# Patient Record
Sex: FEMALE | Race: WHITE | NOT HISPANIC OR LATINO | Employment: UNEMPLOYED | URBAN - NONMETROPOLITAN AREA
[De-identification: names, ages, dates, MRNs, and addresses within clinical notes are randomized per-mention and may not be internally consistent; named-entity substitution may affect disease eponyms.]

---

## 2022-01-25 ENCOUNTER — OFFICE VISIT (OUTPATIENT)
Dept: CARDIOLOGY | Facility: CLINIC | Age: 52
End: 2022-01-25

## 2022-01-25 VITALS
HEART RATE: 90 BPM | OXYGEN SATURATION: 100 % | BODY MASS INDEX: 26.68 KG/M2 | DIASTOLIC BLOOD PRESSURE: 83 MMHG | HEIGHT: 66 IN | WEIGHT: 166 LBS | SYSTOLIC BLOOD PRESSURE: 122 MMHG

## 2022-01-25 DIAGNOSIS — R07.9 CHEST PAIN, UNSPECIFIED TYPE: Primary | ICD-10-CM

## 2022-01-25 DIAGNOSIS — E78.5 DYSLIPIDEMIA: ICD-10-CM

## 2022-01-25 DIAGNOSIS — R00.2 PALPITATIONS: ICD-10-CM

## 2022-01-25 DIAGNOSIS — I83.813 VARICOSE VEINS OF BOTH LOWER EXTREMITIES WITH PAIN: ICD-10-CM

## 2022-01-25 PROCEDURE — 99204 OFFICE O/P NEW MOD 45 MIN: CPT | Performed by: PHYSICIAN ASSISTANT

## 2022-01-25 PROCEDURE — 93000 ELECTROCARDIOGRAM COMPLETE: CPT | Performed by: PHYSICIAN ASSISTANT

## 2022-01-25 RX ORDER — ATORVASTATIN CALCIUM 20 MG/1
20 TABLET, FILM COATED ORAL DAILY
COMMUNITY

## 2022-01-25 RX ORDER — PAROXETINE HYDROCHLORIDE 20 MG/1
20 TABLET, FILM COATED ORAL EVERY MORNING
COMMUNITY

## 2022-01-25 RX ORDER — EMPAGLIFLOZIN 25 MG/1
25 TABLET, FILM COATED ORAL DAILY
COMMUNITY
Start: 2021-12-27

## 2022-01-25 RX ORDER — TIZANIDINE 2 MG/1
2 TABLET ORAL 2 TIMES DAILY
COMMUNITY
Start: 2021-12-27

## 2022-01-25 RX ORDER — ROPINIROLE 0.5 MG/1
TABLET, FILM COATED ORAL
COMMUNITY
Start: 2021-12-10

## 2022-01-25 RX ORDER — DICLOFENAC SODIUM 75 MG/1
75 TABLET, DELAYED RELEASE ORAL 2 TIMES DAILY
COMMUNITY

## 2022-01-25 RX ORDER — HYDROXYZINE HYDROCHLORIDE 25 MG/1
25 TABLET, FILM COATED ORAL 3 TIMES DAILY
COMMUNITY

## 2022-01-25 RX ORDER — GABAPENTIN 300 MG/1
300 CAPSULE ORAL DAILY
COMMUNITY

## 2022-01-25 RX ORDER — NITROGLYCERIN 0.4 MG/1
TABLET SUBLINGUAL
Qty: 25 TABLET | Refills: 11 | Status: SHIPPED | OUTPATIENT
Start: 2022-01-25

## 2022-01-25 NOTE — PATIENT INSTRUCTIONS

## 2022-01-25 NOTE — PROGRESS NOTES
Subjective   Alpa Barr is a 51 y.o. female     Chief Complaint   Patient presents with   • New Patient   • Chest Pain   • Palpitations       HPI    Patient is a 51-year-old female who presents to the office for evaluation.  Patient used to see cardiology in [facility].  She recently has moved to [place]    Patient describes that at random times she will feel a substernal discomfort like a slight ache but then it resolves.  This occurs at random and does not seem to have any exacerbating factor but has been noticeable for quite some time.  She does not describe any level of shortness of breath.  She does not describe PND or orthopnea.    She does describe palpitating at times.  She will feel as if her heart is stopping.  This occurs at random.  She has had some dizziness in the past but does not describe any presyncope or syncope.    Patient also describes discomfort in the lower extremities.  She has significant varicosities of the lower extremities as coaster some pain but otherwise has been doing well      Current Outpatient Medications   Medication Sig Dispense Refill   • atorvastatin (LIPITOR) 20 MG tablet Take 20 mg by mouth Daily.     • diclofenac (VOLTAREN) 75 MG EC tablet Take 75 mg by mouth 2 (Two) Times a Day.     • gabapentin (NEURONTIN) 300 MG capsule Take 300 mg by mouth Daily.     • hydrOXYzine (ATARAX) 25 MG tablet Take 25 mg by mouth 3 (Three) Times a Day.     • Jardiance 25 MG tablet tablet Take 25 mg by mouth Daily.     • PARoxetine (PAXIL) 20 MG tablet Take 20 mg by mouth Every Morning.     • rOPINIRole (REQUIP) 0.5 MG tablet TAKE 1 TABLET BY MOUTH AT BEDTIME FOR RESTLESS LEGS     • tiZANidine (ZANAFLEX) 2 MG tablet 2 mg 2 (Two) Times a Day.     • nitroglycerin (NITROSTAT) 0.4 MG SL tablet 1 under the tongue as needed for angina, may repeat q5mins for up three doses 25 tablet 11     No current facility-administered medications for this visit.       Patient has no known  "allergies.    Past Medical History:   Diagnosis Date   • Anxiety    • Arthritis    • Diabetes mellitus (HCC)    • Hyperlipidemia        Social History     Socioeconomic History   • Marital status: Significant Other   Tobacco Use   • Smoking status: Never Smoker   • Smokeless tobacco: Never Used   Substance and Sexual Activity   • Alcohol use: Never   • Drug use: Never   • Sexual activity: Defer       Family History   Problem Relation Age of Onset   • Heart attack Mother    • Diabetes Mother    • Heart attack Father    • Coronary artery disease Father    • Heart attack Maternal Grandmother        Review of Systems   Constitutional: Negative.  Negative for chills and fever.   HENT: Negative.  Negative for congestion and sinus pressure.    Respiratory: Negative for chest tightness and shortness of breath (at times, thinks related to stress and anxiety).    Cardiovascular: Positive for chest pain and palpitations (flutters at times, flops, makes her cough when it happens). Negative for leg swelling.   Gastrointestinal: Negative.  Negative for abdominal pain, blood in stool, constipation, diarrhea, nausea and vomiting.   Endocrine: Negative.  Negative for cold intolerance and heat intolerance.   Genitourinary: Negative.  Negative for dysuria, frequency, hematuria and urgency.   Musculoskeletal: Positive for back pain (chronic).   Neurological: Positive for dizziness (thinks related to stress, when turning at times). Negative for syncope and light-headedness.   Psychiatric/Behavioral: Positive for sleep disturbance (reports soa at times). The patient is nervous/anxious (reports increased stressed and anxiety recently).        Objective   Vitals:    01/25/22 1103   BP: 122/83   BP Location: Left arm   Patient Position: Sitting   Pulse: 90   SpO2: 100%   Weight: 75.3 kg (166 lb)   Height: 167.6 cm (66\")      /83 (BP Location: Left arm, Patient Position: Sitting)   Pulse 90   Ht 167.6 cm (66\")   Wt 75.3 kg (166 lb)  "  SpO2 100%   BMI 26.79 kg/m²     Lab Results (most recent)     None          Physical Exam  Vitals and nursing note reviewed.   Constitutional:       General: She is not in acute distress.     Appearance: Normal appearance. She is well-developed.   HENT:      Head: Normocephalic and atraumatic.   Eyes:      General: No scleral icterus.        Right eye: No discharge.         Left eye: No discharge.      Conjunctiva/sclera: Conjunctivae normal.   Neck:      Vascular: No carotid bruit.   Cardiovascular:      Rate and Rhythm: Normal rate and regular rhythm.      Heart sounds: Normal heart sounds. No murmur heard.  No friction rub. No gallop.    Pulmonary:      Effort: Pulmonary effort is normal. No respiratory distress.      Breath sounds: Normal breath sounds. No wheezing or rales.   Chest:      Chest wall: No tenderness.   Musculoskeletal:      Right lower leg: No edema.      Left lower leg: No edema.      Comments: Significant varicosities of the lower extremities noted   Skin:     General: Skin is warm and dry.      Coloration: Skin is not pale.      Findings: No erythema or rash.   Neurological:      Mental Status: She is alert and oriented to person, place, and time.      Cranial Nerves: No cranial nerve deficit.   Psychiatric:         Behavior: Behavior normal.         Procedure     ECG 12 Lead    Date/Time: 1/25/2022 11:10 AM  Performed by: Justin Westfall PA  Authorized by: Justin Westfall PA   Comparison: not compared with previous ECG   Previous ECG: no previous ECG available  Comments: EKG demonstrates sinus rhythm at 83 bpm possible septal wall MI age undetermined and no acute ST changes                 Assessment/Plan     Problems Addressed this Visit        Cardiac and Vasculature    Dyslipidemia    Relevant Orders    Adult Transthoracic Echo Complete W/ Cont if Necessary Per Protocol    Stress Test With Myocardial Perfusion One Day    Cardiac Event Monitor    Palpitations    Relevant Orders     Adult Transthoracic Echo Complete W/ Cont if Necessary Per Protocol    Stress Test With Myocardial Perfusion One Day    Cardiac Event Monitor    Chest pain - Primary    Relevant Orders    Adult Transthoracic Echo Complete W/ Cont if Necessary Per Protocol    Stress Test With Myocardial Perfusion One Day    Cardiac Event Monitor      Other Visit Diagnoses     Varicose veins of both lower extremities with pain        Relevant Orders    Ambulatory Referral to Vascular Surgery      Diagnoses       Codes Comments    Chest pain, unspecified type    -  Primary ICD-10-CM: R07.9  ICD-9-CM: 786.50     Palpitations     ICD-10-CM: R00.2  ICD-9-CM: 785.1     Dyslipidemia     ICD-10-CM: E78.5  ICD-9-CM: 272.4     Varicose veins of both lower extremities with pain     ICD-10-CM: I83.813  ICD-9-CM: 454.8             Recommendation  1.  Patient is a 51-year-old male who presents to the office for evaluation that complains of substernal discomfort and complains of palpitations.  She would like to have cardiac work-up performed    2.  Patient will be scheduled for stress testing.  With significant lower extremity discomfort, we will schedule for nuclear testing.    3.  Echo to evaluate LV structure, LV function, assess diastolic performance etc.    4.  I have discussed about her venous issues of the lower extremities and discussed referral to a vascular surgeon that could potentially help.  She does seem interested and we will make referral    5.  Nitroglycerin has been sent in as needed for chest pain.  Any chest pain, not resolved with nitroglycerin as discussed, she is to go to the ER.  We will see her back for follow-up on testing.  Follow-up with primary as scheduled           Patient brought in medicine list to appointment, it's been reviewed with patient and med list was updated in the chart.         Electronically signed by:

## 2022-02-15 ENCOUNTER — TELEPHONE (OUTPATIENT)
Dept: CARDIOLOGY | Facility: CLINIC | Age: 52
End: 2022-02-15

## 2022-02-15 NOTE — TELEPHONE ENCOUNTER
Called pt to notify of no acute findings on testing, no answer lvm.    ----- Message from Virgie Moody LPN sent at 2/14/2022  5:14 PM EST -----  Cardiac Event Monitor  Order: [id number]  Status: Final result    Visible to patient: No (inaccessible in MyChart)    Dx: Palpitations; Dyslipidemia; Chest vinny...    1 Result Note    Details      Reading Physician Reading Date Result Priority  Kenneth Castellanos MD  [phone] 2/10/2022 Routine    Result Text     · Study findings Sinus and sinus tachycardia predominantly throughout the study 2. Occasional PVCs that were symptomatic. One couplet noted symptomatic. Symptoms of palpitations and fatigue also felt during sinus rhythm 3. No other arrhythmia or ectopy were identified. There was, on February 3, 2022, morphology change in regards to QRS. No symptoms reported     The majority of symptoms occurred during sinus tachycardia without ectopy.  Single PVC occurred when the patient complained of tired or fatigued, lightheadedness and chest pain.  Palpitations were not d    ----- Message -----  From: Justin Westfall PA  Sent: 2/14/2022   1:31 PM EST  To: Virgie Moody LPN    Routine follow-up